# Patient Record
Sex: FEMALE | Race: WHITE | NOT HISPANIC OR LATINO | URBAN - NONMETROPOLITAN AREA
[De-identification: names, ages, dates, MRNs, and addresses within clinical notes are randomized per-mention and may not be internally consistent; named-entity substitution may affect disease eponyms.]

---

## 2017-09-21 PROBLEM — H16.223: Noted: 2017-09-21

## 2017-09-21 PROBLEM — E11.9: Noted: 2017-09-21

## 2017-09-21 PROBLEM — H25.13: Noted: 2017-09-21

## 2017-09-21 PROBLEM — H52.4: Noted: 2017-09-21

## 2019-11-05 ENCOUNTER — IMPORTED ENCOUNTER (OUTPATIENT)
Dept: URBAN - NONMETROPOLITAN AREA CLINIC 1 | Facility: CLINIC | Age: 64
End: 2019-11-05

## 2019-11-05 PROCEDURE — 92014 COMPRE OPH EXAM EST PT 1/>: CPT

## 2019-11-05 PROCEDURE — 92015 DETERMINE REFRACTIVE STATE: CPT

## 2019-11-05 NOTE — PATIENT DISCUSSION
System down 11/5/19 Re-entered 11/8/19Presbyopia OU-  Discussed diagnosis in detail with patient-  New glasses Rx given today-  Continue to monitor-  RTC 1 year completeNSC OU- Discussed diagnosis in detail with patient- Discussed signs and symptoms of progression- Discussed UV protection- No treatment needed at this time - Continue to monitorNIDDM (2017)- Discussed diagnosis in detail with patient- Stressed importance of good blood sugar control- Recommend no soda’s- No diabetic retinopathy seen on today's exam- Patient reports last A1C was 5.6 and does not check blood sugar daily - Letter to [facility]- Continue to monitor ADRI OU- Discussed diagnosis in detail with patient- Discussed signs and symptoms of progression- Recommend patient drinking plenty of water and starting Omega 3’s - Recommend Refresh or Systane  throughout the day- Consider Restasis or plugs in the future if no improvement- Continue to monitor

## 2022-04-15 ASSESSMENT — TONOMETRY
OD_IOP_MMHG: 14
OS_IOP_MMHG: 14

## 2022-04-15 ASSESSMENT — VISUAL ACUITY
OD_CC: 20/20
OS_CC: 20/30-